# Patient Record
Sex: FEMALE | Race: WHITE | NOT HISPANIC OR LATINO | Employment: FULL TIME | URBAN - METROPOLITAN AREA
[De-identification: names, ages, dates, MRNs, and addresses within clinical notes are randomized per-mention and may not be internally consistent; named-entity substitution may affect disease eponyms.]

---

## 2024-11-11 DIAGNOSIS — I10 ESSENTIAL HYPERTENSION: ICD-10-CM

## 2024-11-11 RX ORDER — LISINOPRIL 20 MG/1
TABLET ORAL
Qty: 30 TABLET | Refills: 0 | OUTPATIENT
Start: 2024-11-11

## 2024-11-20 ENCOUNTER — OFFICE VISIT (OUTPATIENT)
Dept: FAMILY MEDICINE CLINIC | Facility: CLINIC | Age: 69
End: 2024-11-20
Payer: MEDICARE

## 2024-11-20 VITALS
HEIGHT: 66 IN | HEART RATE: 69 BPM | DIASTOLIC BLOOD PRESSURE: 88 MMHG | TEMPERATURE: 96.9 F | OXYGEN SATURATION: 98 % | BODY MASS INDEX: 29.44 KG/M2 | SYSTOLIC BLOOD PRESSURE: 130 MMHG | WEIGHT: 183.2 LBS

## 2024-11-20 DIAGNOSIS — I10 ESSENTIAL HYPERTENSION: ICD-10-CM

## 2024-11-20 DIAGNOSIS — Z00.00 MEDICARE ANNUAL WELLNESS VISIT, SUBSEQUENT: Primary | ICD-10-CM

## 2024-11-20 DIAGNOSIS — S32.000S LUMBAR COMPRESSION FRACTURE, SEQUELA: ICD-10-CM

## 2024-11-20 DIAGNOSIS — K21.9 GASTROESOPHAGEAL REFLUX DISEASE WITHOUT ESOPHAGITIS: ICD-10-CM

## 2024-11-20 DIAGNOSIS — F51.04 PSYCHOPHYSIOLOGICAL INSOMNIA: ICD-10-CM

## 2024-11-20 DIAGNOSIS — M80.00XG AGE-RELATED OSTEOPOROSIS WITH CURRENT PATHOLOGICAL FRACTURE WITH DELAYED HEALING: ICD-10-CM

## 2024-11-20 DIAGNOSIS — S22.000S THORACIC COMPRESSION FRACTURE, SEQUELA: ICD-10-CM

## 2024-11-20 DIAGNOSIS — E78.2 MIXED HYPERLIPIDEMIA: ICD-10-CM

## 2024-11-20 DIAGNOSIS — E55.9 VITAMIN D DEFICIENCY: ICD-10-CM

## 2024-11-20 RX ORDER — ZOLPIDEM TARTRATE 10 MG/1
10 TABLET ORAL NIGHTLY PRN
Qty: 30 TABLET | Refills: 1 | Status: SHIPPED | OUTPATIENT
Start: 2024-11-20

## 2024-11-20 RX ORDER — ROSUVASTATIN CALCIUM 40 MG/1
40 TABLET, COATED ORAL DAILY
Qty: 90 TABLET | Refills: 1 | Status: SHIPPED | OUTPATIENT
Start: 2024-11-20

## 2024-11-20 RX ORDER — EZETIMIBE 10 MG/1
10 TABLET ORAL DAILY
Qty: 90 TABLET | Refills: 1 | Status: SHIPPED | OUTPATIENT
Start: 2024-11-20

## 2024-11-21 NOTE — ASSESSMENT & PLAN NOTE
Hypertension is stable and controlled  Continue current treatment regimen.  Blood pressure will be reassessed in 6 months.    Orders:    Comprehensive metabolic panel; Future    Vitamin D 25 hydroxy; Future    TSH; Future    Lipid panel; Future    CBC w AUTO Differential; Future

## 2024-11-21 NOTE — ASSESSMENT & PLAN NOTE
Lipid abnormalities are stable    Plan:  Continue same medication/s without change.      Discussed medication dosage, use, side effects, and goals of treatment in detail.    Counseled patient on lifestyle modifications to help control hyperlipidemia.     Patient Treatment Goals:   LDL goal is less than 70  LDL goal is under 100    Followup in 6 months.    Orders:    ezetimibe (ZETIA) 10 MG tablet; Take 1 tablet by mouth Daily.    rosuvastatin (CRESTOR) 40 MG tablet; Take 1 tablet by mouth Daily.    Comprehensive metabolic panel; Future    Vitamin D 25 hydroxy; Future    TSH; Future    Lipid panel; Future    CBC w AUTO Differential; Future

## 2024-11-21 NOTE — PROGRESS NOTES
Subjective   The ABCs of the Annual Wellness Visit  Medicare Wellness Visit      Blessing Sommers is a 69 y.o. patient who presents for a Medicare Wellness Visit.    The following portions of the patient's history were reviewed and   updated as appropriate: allergies, current medications, past family history, past medical history, past social history, past surgical history, and problem list.    Compared to one year ago, the patient's physical   health is the same.  Compared to one year ago, the patient's mental   health is the same.    Recent Hospitalizations:  She was admitted within the past 365 days at [facility].     Current Medical Providers:  Patient Care Team:  Rodrigo Wilkes DO as PCP - General  Sidney Serrato MD (Orthopedic Surgery)  Rudolph Curran OD (Optometry)  Aki Gallegos MD as Consulting Physician (Hematology and Oncology)  Duane Doshi MD (Pain Medicine)  Christina Meza DC (Chiropractic Medicine)  Leonel Bahena MD as Surgeon (Orthopedic Surgery)    Outpatient Medications Prior to Visit   Medication Sig Dispense Refill    aspirin 81 MG tablet Take  by mouth.      B Complex Vitamins (VITAMIN B COMPLEX 100 IJ)       calcium carbonate EX (Tums Chewy Bites) 750 MG chewable tablet  (Patient taking differently: Chew 1 tablet 2 (Two) Times a Day.)      Multiple Vitamins-Calcium (DAILY VITAMINS FOR WOMEN PO)  (Patient taking differently: Take 1 tablet by mouth Daily.)      omeprazole (priLOSEC) 20 MG capsule Take 1 capsule by mouth 2 (Two) Times a Day. (Patient taking differently: Take 1 capsule by mouth Daily.)      Probiotic Product (RISAQUAD-2) capsule capsule Take 1 capsule by mouth Daily.      VITAMIN D PO Take  by mouth. (Patient taking differently: Take  by mouth Daily.)      ezetimibe (ZETIA) 10 MG tablet TAKE 1 TABLET BY MOUTH ONE TIME DAILY 90 tablet 0    rosuvastatin (CRESTOR) 40 MG tablet Take 1 tablet by mouth Daily. 90 tablet 0     benzonatate (TESSALON) 200 MG capsule Take 1 capsule by mouth 3 (Three) Times a Day As Needed for Cough for up to 10 days. 30 capsule 0    promethazine-dextromethorphan (PROMETHAZINE-DM) 6.25-15 MG/5ML syrup Take 5 mL by mouth 4 (Four) Times a Day As Needed for Cough. 118 mL 0     Facility-Administered Medications Prior to Visit   Medication Dose Route Frequency Provider Last Rate Last Admin    denosumab (PROLIA) syringe 60 mg  60 mg Subcutaneous Once Rodrigo Wilkes DO         No opioid medication identified on active medication list. I have reviewed chart for other potential  high risk medication/s and harmful drug interactions in the elderly.      Aspirin is on active medication list. Aspirin use is indicated based on review of current medical condition/s. Pros and cons of this therapy have been discussed today. Benefits of this medication outweigh potential harm.  Patient has been encouraged to continue taking this medication.  .      Patient Active Problem List   Diagnosis    Mixed hyperlipidemia    Age-related osteoporosis with current pathological fracture with delayed healing    Vitamin D deficiency    Postmenopausal    Screening mammogram, encounter for    AC joint arthropathy    Tendinitis of left rotator cuff    Subacromial impingement of left shoulder    Leg pain, anterior, left    Lumbar degenerative disc disease    Cyst of left kidney    Gastroesophageal reflux disease without esophagitis    Tendinitis of right ankle    Mild ankle sprain, sequela    Chronic pain of right ankle    Pain in both thighs    Chronic left hip pain    OA (osteoarthritis) of hip    Sacroiliac pain    Essential hypertension    History of bilateral hip replacements    Thoracic compression fracture, sequela    Lumbar compression fracture, sequela    Status post kyphoplasty     Advance Care Planning Advance Directive is not on file.  ACP discussion was held with the patient during this visit. Patient does not have an advance  "directive, information provided.            Objective   Vitals:    24 1314   BP: 130/88   BP Location: Left arm   Patient Position: Sitting   Cuff Size: Adult   Pulse: 69   Temp: 96.9 °F (36.1 °C)   TempSrc: Infrared   SpO2: 98%   Weight: 83.1 kg (183 lb 3.2 oz)   Height: 167.6 cm (65.98\")   PainSc:   4   PainLoc: Back       Estimated body mass index is 29.58 kg/m² as calculated from the following:    Height as of this encounter: 167.6 cm (65.98\").    Weight as of this encounter: 83.1 kg (183 lb 3.2 oz).            Does the patient have evidence of cognitive impairment? No  Lab Results   Component Value Date    CHLPL 174 10/28/2024    TRIG 119 10/28/2024    HDL 77 (H) 10/28/2024    LDL 76 10/28/2024    VLDL 21 10/28/2024                                                                                                Health  Risk Assessment    Smoking Status:  Social History     Tobacco Use   Smoking Status Former    Current packs/day: 0.00    Average packs/day: 0.3 packs/day for 20.0 years (5.0 ttl pk-yrs)    Types: Cigarettes    Start date: 1993    Quit date: 2022    Years since quittin.8   Smokeless Tobacco Never   Tobacco Comments    I smoked off and on for several years. About 4 cigarettes daily     Alcohol Consumption:  Social History     Substance and Sexual Activity   Alcohol Use Yes    Alcohol/week: 5.0 standard drinks of alcohol    Types: 5 Drinks containing 0.5 oz of alcohol per week       Fall Risk Screen  STEADI Fall Risk Assessment was completed, and patient is at LOW risk for falls.Assessment completed on:2024    Depression Screening   Little interest or pleasure in doing things? Not at all   Feeling down, depressed, or hopeless? Not at all   PHQ-2 Total Score 0      Health Habits and Functional and Cognitive Screenin/13/2024     9:44 AM   Functional & Cognitive Status   Do you have difficulty preparing food and eating? No    Do you have difficulty bathing yourself, " getting dressed or grooming yourself? No    Do you have difficulty using the toilet? No    Do you have difficulty moving around from place to place? No    Do you have trouble with steps or getting out of a bed or a chair? No    Current Diet Well Balanced Diet    Dental Exam Up to date    Eye Exam Up to date    Exercise (times per week) 4 times per week    Current Exercises Include Stationary Bicycling/Spin Class    Do you need help using the phone?  No    Are you deaf or do you have serious difficulty hearing?  No    Do you need help to go to places out of walking distance? No    Do you need help shopping? No    Do you need help preparing meals?  No    Do you need help with housework?  No    Do you need help with laundry? No    Do you need help taking your medications? No    Do you need help managing money? No    Do you ever drive or ride in a car without wearing a seat belt? No    Have you felt unusual stress, anger or loneliness in the last month? No    Who do you live with? Spouse    If you need help, do you have trouble finding someone available to you? No    Have you been bothered in the last four weeks by sexual problems? No    Do you have difficulty concentrating, remembering or making decisions? No        Patient-reported           Age-appropriate Screening Schedule:  Refer to the list below for future screening recommendations based on patient's age, sex and/or medical conditions. Orders for these recommended tests are listed in the plan section. The patient has been provided with a written plan.    Health Maintenance List  Health Maintenance   Topic Date Due    ZOSTER VACCINE (1 of 2) 11/20/2024 (Originally 7/28/2006)    COVID-19 Vaccine (3 - 2024-25 season) 11/22/2024 (Originally 9/1/2024)    HEPATITIS C SCREENING  12/31/2024 (Originally 2/12/2016)    TDAP/TD VACCINES (1 - Tdap) 12/31/2024 (Originally 7/28/1975)    INFLUENZA VACCINE  03/31/2025 (Originally 8/1/2024)    Pneumococcal Vaccine 65+ (1 of 2 -  PCV) 11/20/2025 (Originally [birth year]/28/1962)    BMI FOLLOWUP  09/25/2025    LIPID PANEL  10/28/2025    ANNUAL WELLNESS VISIT  11/20/2025    DXA SCAN  06/24/2026    MAMMOGRAM  09/27/2026    PAP SMEAR  11/20/2027    COLORECTAL CANCER SCREENING  09/07/2032                                                                                                                                                CMS Preventative Services Quick Reference  Risk Factors Identified During Encounter  Chronic Pain: Natural history and expected course discussed. Questions answered.  Immunizations Discussed/Encouraged: Influenza  Inactivity/Sedentary: Patient was advised to exercise at least 150 minutes a week per CDC recommendations.  Dental Screening Recommended  Vision Screening Recommended    The above risks/problems have been discussed with the patient.  Pertinent information has been shared with the patient in the After Visit Summary.  An After Visit Summary and PPPS were made available to the patient.    Follow Up:   Next Medicare Wellness visit to be scheduled in 1 year.         Additional E&M Note during same encounter follows:  Patient has additional, significant, and separately identifiable condition(s)/problem(s) that require work above and beyond the Medicare Wellness Visit     Chief Complaint  Medicare Wellness-subsequent (Labs in October)    Subjective   HPI 69-year-old white female here for AWV-Medicare as well as in follow-up for several different medical issues.  Patient with known history of hyperlipidemia, GERD as well as age-related osteoporosis and vitamin D deficiency.  Patient is status post compression fractures in the thoracic and lumbar spine.  This has been somewhat of a mystery given the fact that her DEXA scan showed osteoporosis but just borderline.  She has had workup to include biopsies of these regions with no evidence of multiple myeloma or other cancers.  She underwent kyphoplasty.  She has had extensive physical  "therapy and states that she is just now getting back to where she can bend over and put her socks on.  Preceding this but early this year she underwent bilateral hip replacements.  Her acute complaint is that she is not able to sleep.  She often goes to bed at 10 and wakes up at 2 and cannot go back to sleep the rest of the night.  She has been trying to exist on 4 hours of sleep per night.  She does use her 's 10 mg Ambien at times-cutting them in half which will often buy her a longer timeframe of sleep.  She has requesting her own prescription of this product.  Current medications include Zetia, rosuvastatin, 81 mg aspirin daily as well as omeprazole.  She does take a host of supplements and vitamins.  Blessing Jimenez is also being seen today for an annual adult preventative physical exam.  and Blessing Jimenez is also being seen today for additional medical problem/s.    Review of Systems   Cardiovascular:         Hyperlipidemia   Gastrointestinal:         GERD   Musculoskeletal:  Positive for arthralgias and back pain.   Psychiatric/Behavioral:  Positive for sleep disturbance.               Objective   Vital Signs:  /88 (BP Location: Left arm, Patient Position: Sitting, Cuff Size: Adult)   Pulse 69   Temp 96.9 °F (36.1 °C) (Infrared)   Ht 167.6 cm (65.98\")   Wt 83.1 kg (183 lb 3.2 oz)   SpO2 98%   BMI 29.58 kg/m²   Physical Exam  Vitals and nursing note reviewed.   Constitutional:       Appearance: Normal appearance. She is well-developed, well-groomed and overweight.   HENT:      Head: Normocephalic and atraumatic.   Neck:      Thyroid: No thyroid mass or thyromegaly.      Vascular: Normal carotid pulses. No carotid bruit.      Trachea: Trachea and phonation normal.   Cardiovascular:      Rate and Rhythm: Normal rate and regular rhythm.      Heart sounds: Normal heart sounds. No murmur heard.     No friction rub. No gallop.   Pulmonary:      Effort: Pulmonary effort is normal. No respiratory distress. "      Breath sounds: Normal breath sounds. No decreased breath sounds, wheezing, rhonchi or rales.   Musculoskeletal:      Cervical back: Neck supple.   Lymphadenopathy:      Cervical: No cervical adenopathy.   Skin:     General: Skin is warm and dry.      Findings: No rash.   Neurological:      Mental Status: She is alert and oriented to person, place, and time.   Psychiatric:         Attention and Perception: Attention and perception normal.         Mood and Affect: Mood and affect normal.         Speech: Speech normal.         Behavior: Behavior normal. Behavior is cooperative.         Thought Content: Thought content normal.         Cognition and Memory: Cognition and memory normal.         Judgment: Judgment normal.     See labs dated 10/28/2024  Admission on 11/09/2024, Discharged on 11/09/2024   Component Date Value Ref Range Status    SARS Antigen 11/09/2024 Not Detected  Not Detected, Presumptive Negative Final    Influenza A Antigen ANTHONY 11/09/2024 Not Detected  Not Detected Final    Influenza B Antigen ANTHONY 11/09/2024 Not Detected  Not Detected Final    Internal Control 11/09/2024 Passed  Passed Final    Lot Number 11/09/2024 [id number]   Final    Expiration Date 11/09/2024 09/04/2025   Final         The following data was reviewed by: Rodrigo Wilkes DO on 11/20/2024:  Data reviewed : GI studies colonoscopy  Common labs          7/7/2024    10:40 10/8/2024    09:34 10/28/2024    10:10   Common Labs   Glucose  88  94    BUN  12  12    Creatinine  0.62  0.72    Sodium  140  141    Potassium  4.7  4.3    Chloride  102  103    Calcium 8.1     9.6  9.3    Total Protein   6.7    Albumin   4.5    Total Bilirubin   0.4    Alkaline Phosphatase   89    AST (SGOT)   27    ALT (SGPT)   27    WBC 8.00      7.96    Hemoglobin 12.4      14.4    Hematocrit 37.3      41.6    Platelets 201      197    Total Cholesterol   174    Triglycerides   119    HDL Cholesterol   77    LDL Cholesterol    76       Details          This  result is from an external source.                     Assessment and Plan Additional age appropriate preventative wellness advice topics were discussed during today's preventative wellness exam(some topics already addressed during AWV portion of the note above):    Physical Activity: Advised cardiovascular activity 150 minutes per week as tolerated. (example brisk walk for 30 minutes, 5 days a week).     Nutrition: Discussed nutrition plan with patient. Information shared in after visit summary. Goal is for a well balanced diet to enhance overall health.     Healthy Weight: Discussed current and goal BMI with patient. Steps to attain this goal discussed. Information shared in after visit summary.     Motor Vehicle Safety Discussion:  Wearing Seatbelt While in Motor Vehicle recommendation. Adhering to posted speed limit recommendation.     Injury Prevention Discussion:  Information shared in after visit summary.                 Medicare annual wellness visit, subsequent    Orders:    Comprehensive metabolic panel; Future    Vitamin D 25 hydroxy; Future    TSH; Future    Lipid panel; Future    CBC w AUTO Differential; Future    Psychophysiological insomnia  Psychological condition is stable.  Continue current treatment regimen.  Psychological condition  will be reassessed in 6 months.    Orders:    zolpidem (AMBIEN) 10 MG tablet; Take 1 tablet by mouth At Night As Needed for Sleep.    Comprehensive metabolic panel; Future    Vitamin D 25 hydroxy; Future    TSH; Future    Lipid panel; Future    CBC w AUTO Differential; Future    Essential hypertension  Hypertension is stable and controlled  Continue current treatment regimen.  Blood pressure will be reassessed in 6 months.    Orders:    Comprehensive metabolic panel; Future    Vitamin D 25 hydroxy; Future    TSH; Future    Lipid panel; Future    CBC w AUTO Differential; Future    Mixed hyperlipidemia   Lipid abnormalities are stable    Plan:  Continue same  medication/s without change.      Discussed medication dosage, use, side effects, and goals of treatment in detail.    Counseled patient on lifestyle modifications to help control hyperlipidemia.     Patient Treatment Goals:   LDL goal is less than 70  LDL goal is under 100    Followup in 6 months.    Orders:    ezetimibe (ZETIA) 10 MG tablet; Take 1 tablet by mouth Daily.    rosuvastatin (CRESTOR) 40 MG tablet; Take 1 tablet by mouth Daily.    Comprehensive metabolic panel; Future    Vitamin D 25 hydroxy; Future    TSH; Future    Lipid panel; Future    CBC w AUTO Differential; Future    Vitamin D deficiency    Orders:    Comprehensive metabolic panel; Future    Vitamin D 25 hydroxy; Future    TSH; Future    Lipid panel; Future    CBC w AUTO Differential; Future    Gastroesophageal reflux disease without esophagitis    Orders:    Comprehensive metabolic panel; Future    Vitamin D 25 hydroxy; Future    TSH; Future    Lipid panel; Future    CBC w AUTO Differential; Future    Age-related osteoporosis with current pathological fracture with delayed healing    Orders:    Comprehensive metabolic panel; Future    Vitamin D 25 hydroxy; Future    TSH; Future    Lipid panel; Future    CBC w AUTO Differential; Future    Lumbar compression fracture, sequela    Orders:    Comprehensive metabolic panel; Future    Vitamin D 25 hydroxy; Future    TSH; Future    Lipid panel; Future    CBC w AUTO Differential; Future    Thoracic compression fracture, sequela    Orders:    Comprehensive metabolic panel; Future    Vitamin D 25 hydroxy; Future    TSH; Future    Lipid panel; Future    CBC w AUTO Differential; Future          I spent 30 minutes caring for Blessing Jimenez on this date of service. This time includes time spent by me in the following activities:preparing for the visit, reviewing tests, obtaining and/or reviewing a separately obtained history, performing a medically appropriate examination and/or evaluation , counseling and  educating the patient/family/caregiver, ordering medications, tests, or procedures, referring and communicating with other health care professionals , documenting information in the medical record, independently interpreting results and communicating that information with the patient/family/caregiver, and care coordination  Follow Up   Return in about 6 months (around 5/20/2025) for Recheck.  Patient was given instructions and counseling regarding her condition or for health maintenance advice. Please see specific information pulled into the AVS if appropriate.

## 2024-11-21 NOTE — ASSESSMENT & PLAN NOTE
Orders:    Comprehensive metabolic panel; Future    Vitamin D 25 hydroxy; Future    TSH; Future    Lipid panel; Future    CBC w AUTO Differential; Future

## 2024-12-30 ENCOUNTER — HOSPITAL ENCOUNTER (OUTPATIENT)
Dept: INFUSION THERAPY | Facility: HOSPITAL | Age: 69
Discharge: HOME | End: 2024-12-30
Admitting: FAMILY MEDICINE
Payer: MEDICARE

## 2024-12-30 VITALS
SYSTOLIC BLOOD PRESSURE: 134 MMHG | HEART RATE: 79 BPM | DIASTOLIC BLOOD PRESSURE: 74 MMHG | RESPIRATION RATE: 16 BRPM | OXYGEN SATURATION: 96 % | TEMPERATURE: 97.1 F

## 2024-12-30 DIAGNOSIS — M81.0 AGE-RELATED OSTEOPOROSIS WITHOUT CURRENT PATHOLOGICAL FRACTURE: Primary | ICD-10-CM

## 2024-12-30 DIAGNOSIS — M80.00XG AGE-RELATED OSTEOPOROSIS WITH CURRENT PATHOLOGICAL FRACTURE WITH DELAYED HEALING: ICD-10-CM

## 2024-12-30 LAB
MAGNESIUM SERPL-MCNC: 1.8 MG/DL (ref 1.6–2.4)
PHOSPHATE SERPL-MCNC: 3.4 MG/DL (ref 2.5–4.5)

## 2024-12-30 PROCEDURE — 25010000002 DENOSUMAB 60 MG/ML SOLUTION PREFILLED SYRINGE: Performed by: FAMILY MEDICINE

## 2024-12-30 PROCEDURE — 36415 COLL VENOUS BLD VENIPUNCTURE: CPT

## 2024-12-30 PROCEDURE — 84100 ASSAY OF PHOSPHORUS: CPT | Performed by: FAMILY MEDICINE

## 2024-12-30 PROCEDURE — 96372 THER/PROPH/DIAG INJ SC/IM: CPT

## 2024-12-30 PROCEDURE — 83735 ASSAY OF MAGNESIUM: CPT | Performed by: FAMILY MEDICINE

## 2024-12-30 RX ADMIN — DENOSUMAB 60 MG: 60 INJECTION SUBCUTANEOUS at 09:29

## 2024-12-30 NOTE — PATIENT INSTRUCTIONS
"  Call [facility] at [phone] if you have any problems or concerns.    We know you have a Choice in healthcare and appreciate you using [facility].  Our purpose is to provide you \"Excellent Care\".  We hope that you will always choose us in the future and continue to recommend us to your family and friends.              "

## 2024-12-30 NOTE — NURSING NOTE
PT ARRIVED TO [facility] FOR APPT. VSS, NO COMPLAINTS AT THIS TIME. LABS DRAWN VIA VENIPUNCTURE. MEDICATION ADMINISTERED PER MD ORDER. PT TOLERATED WELL. AVS OFFERED, PT REFUSED. PT DISCHARGED FROM [facility] AT 9:33 AM IN STABLE CONDITION, WITHOUT COMPLAINTS.

## 2025-05-07 DIAGNOSIS — M80.00XG AGE-RELATED OSTEOPOROSIS WITH CURRENT PATHOLOGICAL FRACTURE WITH DELAYED HEALING: Primary | ICD-10-CM

## 2025-05-17 DIAGNOSIS — E78.2 MIXED HYPERLIPIDEMIA: ICD-10-CM

## 2025-05-19 ENCOUNTER — TELEPHONE (OUTPATIENT)
Dept: ENDOCRINOLOGY | Age: 70
End: 2025-05-19
Payer: MEDICARE

## 2025-05-19 DIAGNOSIS — S32.000S LUMBAR COMPRESSION FRACTURE, SEQUELA: ICD-10-CM

## 2025-05-19 DIAGNOSIS — M80.00XG AGE-RELATED OSTEOPOROSIS WITH CURRENT PATHOLOGICAL FRACTURE WITH DELAYED HEALING: ICD-10-CM

## 2025-05-19 DIAGNOSIS — K21.9 GASTROESOPHAGEAL REFLUX DISEASE WITHOUT ESOPHAGITIS: ICD-10-CM

## 2025-05-19 DIAGNOSIS — S22.000S THORACIC COMPRESSION FRACTURE, SEQUELA: ICD-10-CM

## 2025-05-19 DIAGNOSIS — I10 ESSENTIAL HYPERTENSION: ICD-10-CM

## 2025-05-19 DIAGNOSIS — F51.04 PSYCHOPHYSIOLOGICAL INSOMNIA: ICD-10-CM

## 2025-05-19 DIAGNOSIS — E78.2 MIXED HYPERLIPIDEMIA: ICD-10-CM

## 2025-05-19 DIAGNOSIS — E55.9 VITAMIN D DEFICIENCY: ICD-10-CM

## 2025-05-19 DIAGNOSIS — Z00.00 MEDICARE ANNUAL WELLNESS VISIT, SUBSEQUENT: ICD-10-CM

## 2025-05-19 DIAGNOSIS — M80.00XG AGE-RELATED OSTEOPOROSIS WITH CURRENT PATHOLOGICAL FRACTURE WITH DELAYED HEALING: Primary | ICD-10-CM

## 2025-05-19 RX ORDER — EZETIMIBE 10 MG/1
10 TABLET ORAL DAILY
Qty: 90 TABLET | Refills: 0 | Status: SHIPPED | OUTPATIENT
Start: 2025-05-19

## 2025-05-23 LAB
25(OH)D3+25(OH)D2 SERPL-MCNC: 83.2 NG/ML (ref 30–100)
ALBUMIN SERPL-MCNC: 4.6 G/DL (ref 3.5–5.2)
ALBUMIN/GLOB SERPL: 1.9 G/DL
ALP SERPL-CCNC: 88 U/L (ref 39–117)
ALT SERPL-CCNC: 32 U/L (ref 1–33)
AST SERPL-CCNC: 34 U/L (ref 1–32)
BASOPHILS # BLD AUTO: 0.05 10*3/MM3 (ref 0–0.2)
BASOPHILS NFR BLD AUTO: 0.9 % (ref 0–1.5)
BILIRUB SERPL-MCNC: 0.4 MG/DL (ref 0–1.2)
BUN SERPL-MCNC: 9 MG/DL (ref 8–23)
BUN/CREAT SERPL: 12.3 (ref 7–25)
CALCIUM SERPL-MCNC: 9.4 MG/DL (ref 8.6–10.5)
CHLORIDE SERPL-SCNC: 106 MMOL/L (ref 98–107)
CHOLEST SERPL-MCNC: 159 MG/DL (ref 0–200)
CO2 SERPL-SCNC: 25.9 MMOL/L (ref 22–29)
CREAT SERPL-MCNC: 0.73 MG/DL (ref 0.57–1)
EGFRCR SERPLBLD CKD-EPI 2021: 89.7 ML/MIN/1.73
EOSINOPHIL # BLD AUTO: 0.21 10*3/MM3 (ref 0–0.4)
EOSINOPHIL NFR BLD AUTO: 3.9 % (ref 0.3–6.2)
ERYTHROCYTE [DISTWIDTH] IN BLOOD BY AUTOMATED COUNT: 12.3 % (ref 12.3–15.4)
GLOBULIN SER CALC-MCNC: 2.4 GM/DL
GLUCOSE SERPL-MCNC: 103 MG/DL (ref 65–99)
HCT VFR BLD AUTO: 42.8 % (ref 34–46.6)
HDLC SERPL-MCNC: 68 MG/DL (ref 40–60)
HGB BLD-MCNC: 14.8 G/DL (ref 12–15.9)
IMM GRANULOCYTES # BLD AUTO: 0.01 10*3/MM3 (ref 0–0.05)
IMM GRANULOCYTES NFR BLD AUTO: 0.2 % (ref 0–0.5)
LDLC SERPL CALC-MCNC: 76 MG/DL (ref 0–100)
LYMPHOCYTES # BLD AUTO: 1.35 10*3/MM3 (ref 0.7–3.1)
LYMPHOCYTES NFR BLD AUTO: 25.3 % (ref 19.6–45.3)
MCH RBC QN AUTO: 33 PG (ref 26.6–33)
MCHC RBC AUTO-ENTMCNC: 34.6 G/DL (ref 31.5–35.7)
MCV RBC AUTO: 95.5 FL (ref 79–97)
MONOCYTES # BLD AUTO: 0.57 10*3/MM3 (ref 0.1–0.9)
MONOCYTES NFR BLD AUTO: 10.7 % (ref 5–12)
NEUTROPHILS # BLD AUTO: 3.14 10*3/MM3 (ref 1.7–7)
NEUTROPHILS NFR BLD AUTO: 59 % (ref 42.7–76)
NRBC BLD AUTO-RTO: 0 /100 WBC (ref 0–0.2)
PLATELET # BLD AUTO: 177 10*3/MM3 (ref 140–450)
POTASSIUM SERPL-SCNC: 4.7 MMOL/L (ref 3.5–5.2)
PROT SERPL-MCNC: 7 G/DL (ref 6–8.5)
RBC # BLD AUTO: 4.48 10*6/MM3 (ref 3.77–5.28)
SODIUM SERPL-SCNC: 143 MMOL/L (ref 136–145)
TRIGL SERPL-MCNC: 78 MG/DL (ref 0–150)
TSH SERPL DL<=0.005 MIU/L-ACNC: 2.06 UIU/ML (ref 0.27–4.2)
VLDLC SERPL CALC-MCNC: 15 MG/DL (ref 5–40)
WBC # BLD AUTO: 5.33 10*3/MM3 (ref 3.4–10.8)

## 2025-05-29 ENCOUNTER — OFFICE VISIT (OUTPATIENT)
Dept: FAMILY MEDICINE CLINIC | Facility: CLINIC | Age: 70
End: 2025-05-29
Payer: MEDICARE

## 2025-05-29 VITALS
BODY MASS INDEX: 30.13 KG/M2 | DIASTOLIC BLOOD PRESSURE: 76 MMHG | SYSTOLIC BLOOD PRESSURE: 120 MMHG | WEIGHT: 187.5 LBS | HEART RATE: 71 BPM | OXYGEN SATURATION: 97 % | HEIGHT: 66 IN | TEMPERATURE: 97.3 F

## 2025-05-29 DIAGNOSIS — K21.9 GASTROESOPHAGEAL REFLUX DISEASE WITHOUT ESOPHAGITIS: ICD-10-CM

## 2025-05-29 DIAGNOSIS — M51.360 DEGENERATION OF INTERVERTEBRAL DISC OF LUMBAR REGION WITH DISCOGENIC BACK PAIN: ICD-10-CM

## 2025-05-29 DIAGNOSIS — Z98.890 STATUS POST KYPHOPLASTY: ICD-10-CM

## 2025-05-29 DIAGNOSIS — S32.000S LUMBAR COMPRESSION FRACTURE, SEQUELA: ICD-10-CM

## 2025-05-29 DIAGNOSIS — M80.00XG AGE-RELATED OSTEOPOROSIS WITH CURRENT PATHOLOGICAL FRACTURE WITH DELAYED HEALING: ICD-10-CM

## 2025-05-29 DIAGNOSIS — E55.9 VITAMIN D DEFICIENCY: ICD-10-CM

## 2025-05-29 DIAGNOSIS — R73.02 GLUCOSE INTOLERANCE (IMPAIRED GLUCOSE TOLERANCE): ICD-10-CM

## 2025-05-29 DIAGNOSIS — R82.90 ABNORMAL URINE FINDINGS: ICD-10-CM

## 2025-05-29 DIAGNOSIS — S22.000S THORACIC COMPRESSION FRACTURE, SEQUELA: ICD-10-CM

## 2025-05-29 DIAGNOSIS — M80.00XG AGE-RELATED OSTEOPOROSIS WITH CURRENT PATHOLOGICAL FRACTURE WITH DELAYED HEALING: Primary | ICD-10-CM

## 2025-05-29 DIAGNOSIS — E55.9 VITAMIN D DEFICIENCY DISEASE: ICD-10-CM

## 2025-05-29 DIAGNOSIS — E78.2 MIXED HYPERLIPIDEMIA: Primary | ICD-10-CM

## 2025-05-29 DIAGNOSIS — Z78.0 POSTMENOPAUSAL: ICD-10-CM

## 2025-05-29 DIAGNOSIS — N30.00 ACUTE CYSTITIS WITHOUT HEMATURIA: ICD-10-CM

## 2025-05-29 LAB
BILIRUB BLD-MCNC: NEGATIVE MG/DL
BUN SERPL-MCNC: 10 MG/DL (ref 8–23)
BUN/CREAT SERPL: 13.9 (ref 7–25)
CALCIUM SERPL-MCNC: 9.2 MG/DL (ref 8.6–10.5)
CHLORIDE SERPL-SCNC: 106 MMOL/L (ref 98–107)
CLARITY, POC: CLEAR
CO2 SERPL-SCNC: 26.7 MMOL/L (ref 22–29)
COLOR UR: YELLOW
CREAT SERPL-MCNC: 0.72 MG/DL (ref 0.57–1)
EGFRCR SERPLBLD CKD-EPI 2021: 91.2 ML/MIN/1.73
GLUCOSE SERPL-MCNC: 98 MG/DL (ref 65–99)
GLUCOSE UR STRIP-MCNC: NEGATIVE MG/DL
KETONES UR QL: NEGATIVE
LEUKOCYTE EST, POC: ABNORMAL
NITRITE UR-MCNC: NEGATIVE MG/ML
PH UR: 6.5 [PH] (ref 5–8)
POTASSIUM SERPL-SCNC: 4.8 MMOL/L (ref 3.5–5.2)
PROT UR STRIP-MCNC: NEGATIVE MG/DL
PTH-INTACT SERPL-MCNC: NORMAL PG/ML
RBC # UR STRIP: ABNORMAL /UL
SODIUM SERPL-SCNC: 144 MMOL/L (ref 136–145)
SP GR UR: 1 (ref 1–1.03)
SPECIMEN STATUS: NORMAL
UROBILINOGEN UR QL: ABNORMAL

## 2025-05-29 RX ORDER — CIPROFLOXACIN 500 MG/1
500 TABLET, FILM COATED ORAL 2 TIMES DAILY
Qty: 14 TABLET | Refills: 0 | Status: SHIPPED | OUTPATIENT
Start: 2025-05-29

## 2025-05-30 PROBLEM — R73.02 GLUCOSE INTOLERANCE (IMPAIRED GLUCOSE TOLERANCE): Status: ACTIVE | Noted: 2025-05-30

## 2025-05-30 RX ORDER — ROSUVASTATIN CALCIUM 40 MG/1
40 TABLET, COATED ORAL DAILY
Qty: 90 TABLET | Refills: 1 | Status: SHIPPED | OUTPATIENT
Start: 2025-05-30

## 2025-05-30 NOTE — PROGRESS NOTES
Subjective   Blessing Sommers is a 69 y.o. female with   Chief Complaint   Patient presents with    Hypertension     Labs prior    Hyperlipidemia    Vitamin D Deficiency    Insomnia    Pressure with urination     Just finished antibiotics for UTI, still having pressure, did a urine dip, trace blood small amount of leukocytes    .    Hypertension  Hyperlipidemia    Insomnia     69-year-old white female with multiple medical issues here for further medical management.  Patient with known history of hypertension, hyperlipidemia as well as vitamin D deficiency.  She also has history of osteoporosis and is status post multiple vertebral fractures.  She is status post bilateral hip replacement with apparently the wrong prosthetic device placed in the right hip resulting in a leg shorter than the other.  She has been offered a revision which she is very hesitant to do given the comfort and recovery time following this type of surgery.  She has just been in [place] for a prolonged vacation.  She has history of insomnia.  She is also complaining of suprapubic pressure with urinary frequency and urgency.  Patient denies gross hematuria and there has not been any overt dysuria.  Current medications include 81 mg aspirin, Zetia, omeprazole as well as rosuvastatin.  She is also using Zolipidem, and is receiving Prolia on a twice a year basis.  All medications and supplements are used appropriately and are well-tolerated without side effects.  Fasting labs have been acquired prior to this visit.  Last DEXA scan was 1 year ago.  The following portions of the patient's history were reviewed and updated as appropriate: allergies, current medications, past family history, past medical history, past social history, past surgical history and problem list.    Review of Systems   Cardiovascular:         Hyperlipidemia   Gastrointestinal:         GERD   Musculoskeletal:         Osteoporosis   Psychiatric/Behavioral:  Positive for sleep  disturbance. The patient has insomnia.        Objective     Vitals:    05/29/25 1358   BP: 120/76   Pulse: 71   Temp: 97.3 °F (36.3 °C)   SpO2: 97%       Recent Results (from the past 4 weeks)   PTH, Intact    Collection Time: 05/22/25  8:32 AM    Specimen: Blood   Result Value Ref Range    PTH, Intact CANCELED pg/mL   Basic Metabolic Panel    Collection Time: 05/22/25  8:32 AM    Specimen: Blood   Result Value Ref Range    Glucose 98 65 - 99 mg/dL    BUN 10 8 - 23 mg/dL    Creatinine 0.72 0.57 - 1.00 mg/dL    EGFR Result 91.2 >60.0 mL/min/1.73    BUN/Creatinine Ratio 13.9 7.0 - 25.0    Sodium 144 136 - 145 mmol/L    Potassium 4.8 3.5 - 5.2 mmol/L    Chloride 106 98 - 107 mmol/L    Total CO2 26.7 22.0 - 29.0 mmol/L    Calcium 9.2 8.6 - 10.5 mg/dL   Specimen Status Report    Collection Time: 05/22/25  8:32 AM   Result Value Ref Range    Specimen Status CANCELED    Comprehensive metabolic panel    Collection Time: 05/22/25  8:37 AM    Specimen: Blood   Result Value Ref Range    Glucose 103 (H) 65 - 99 mg/dL    BUN 9 8 - 23 mg/dL    Creatinine 0.73 0.57 - 1.00 mg/dL    EGFR Result 89.7 >60.0 mL/min/1.73    BUN/Creatinine Ratio 12.3 7.0 - 25.0    Sodium 143 136 - 145 mmol/L    Potassium 4.7 3.5 - 5.2 mmol/L    Chloride 106 98 - 107 mmol/L    Total CO2 25.9 22.0 - 29.0 mmol/L    Calcium 9.4 8.6 - 10.5 mg/dL    Total Protein 7.0 6.0 - 8.5 g/dL    Albumin 4.6 3.5 - 5.2 g/dL    Globulin 2.4 gm/dL    A/G Ratio 1.9 g/dL    Total Bilirubin 0.4 0.0 - 1.2 mg/dL    Alkaline Phosphatase 88 39 - 117 U/L    AST (SGOT) 34 (H) 1 - 32 U/L    ALT (SGPT) 32 1 - 33 U/L   Vitamin D 25 hydroxy    Collection Time: 05/22/25  8:37 AM    Specimen: Blood   Result Value Ref Range    25 Hydroxy, Vitamin D 83.2 30.0 - 100.0 ng/ml   TSH    Collection Time: 05/22/25  8:37 AM    Specimen: Blood   Result Value Ref Range    TSH 2.060 0.270 - 4.200 uIU/mL   Lipid panel    Collection Time: 05/22/25  8:37 AM    Specimen: Blood   Result Value Ref Range     Total Cholesterol 159 0 - 200 mg/dL    Triglycerides 78 0 - 150 mg/dL    HDL Cholesterol 68 (H) 40 - 60 mg/dL    VLDL Cholesterol Lee 15 5 - 40 mg/dL    LDL Chol Calc (NIH) 76 0 - 100 mg/dL   CBC w AUTO Differential    Collection Time: 05/22/25  8:37 AM    Specimen: Blood   Result Value Ref Range    WBC 5.33 3.40 - 10.80 10*3/mm3    RBC 4.48 3.77 - 5.28 10*6/mm3    Hemoglobin 14.8 12.0 - 15.9 g/dL    Hematocrit 42.8 34.0 - 46.6 %    MCV 95.5 79.0 - 97.0 fL    MCH 33.0 26.6 - 33.0 pg    MCHC 34.6 31.5 - 35.7 g/dL    RDW 12.3 12.3 - 15.4 %    Platelets 177 140 - 450 10*3/mm3    Neutrophil Rel % 59.0 42.7 - 76.0 %    Lymphocyte Rel % 25.3 19.6 - 45.3 %    Monocyte Rel % 10.7 5.0 - 12.0 %    Eosinophil Rel % 3.9 0.3 - 6.2 %    Basophil Rel % 0.9 0.0 - 1.5 %    Neutrophils Absolute 3.14 1.70 - 7.00 10*3/mm3    Lymphocytes Absolute 1.35 0.70 - 3.10 10*3/mm3    Monocytes Absolute 0.57 0.10 - 0.90 10*3/mm3    Eosinophils Absolute 0.21 0.00 - 0.40 10*3/mm3    Basophils Absolute 0.05 0.00 - 0.20 10*3/mm3    Immature Granulocyte Rel % 0.2 0.0 - 0.5 %    Immature Grans Absolute 0.01 0.00 - 0.05 10*3/mm3    nRBC 0.0 0.0 - 0.2 /100 WBC   POC Urinalysis Dipstick    Collection Time: 05/29/25  2:10 PM    Specimen: Urine   Result Value Ref Range    Color Yellow Yellow, Straw, Dark Yellow, [place]    Clarity, UA Clear Clear    Glucose, UA Negative Negative mg/dL    Bilirubin Negative Negative    Ketones, UA Negative Negative    Specific Gravity  1.005 1.005 - 1.030    Blood, UA Trace (A) Negative    pH, Urine 6.5 5.0 - 8.0    Protein, POC Negative Negative mg/dL    Urobilinogen, UA 0.2 E.U./dL Normal, 0.2 E.U./dL    Leukocytes Small (1+) (A) Negative    Nitrite, UA Negative Negative       Physical Exam  Vitals and nursing note reviewed.   Constitutional:       Appearance: Normal appearance. She is well-developed and well-groomed. She is obese.      Comments: Exogenous obesity with a BMI of 30.3   HENT:      Head: Normocephalic and  atraumatic.   Neck:      Thyroid: No thyroid mass or thyromegaly.      Vascular: Normal carotid pulses. No carotid bruit.      Trachea: Trachea and phonation normal.   Cardiovascular:      Rate and Rhythm: Normal rate and regular rhythm.      Heart sounds: Normal heart sounds. No murmur heard.     No friction rub. No gallop.   Pulmonary:      Effort: Pulmonary effort is normal. No respiratory distress.      Breath sounds: Normal breath sounds. No decreased breath sounds, wheezing, rhonchi or rales.   Musculoskeletal:      Cervical back: Neck supple.   Lymphadenopathy:      Cervical: No cervical adenopathy.   Skin:     General: Skin is warm and dry.      Findings: No rash.   Neurological:      Mental Status: She is alert and oriented to person, place, and time.   Psychiatric:         Attention and Perception: Attention and perception normal.         Mood and Affect: Mood and affect normal.         Speech: Speech normal.         Behavior: Behavior normal. Behavior is cooperative.         Thought Content: Thought content normal.         Cognition and Memory: Cognition and memory normal.         Judgment: Judgment normal.         Assessment & Plan   Diagnoses and all orders for this visit:    1. Mixed hyperlipidemia (Primary)  -     rosuvastatin (CRESTOR) 40 MG tablet; Take 1 tablet by mouth Daily.  Dispense: 90 tablet; Refill: 1  -     Hemoglobin A1c; Future  -     Lipid panel; Future  -     Comprehensive metabolic panel; Future  -     TSH; Future  -     Vitamin D 25 hydroxy; Future  -     CBC w AUTO Differential; Future    2. Acute cystitis without hematuria  -     POC Urinalysis Dipstick  -     Urine Culture - Urine, Urine, Clean Catch  -     ciprofloxacin (Cipro) 500 MG tablet; Take 1 tablet by mouth 2 (Two) Times a Day.  Dispense: 14 tablet; Refill: 0  -     Hemoglobin A1c; Future  -     Lipid panel; Future  -     Comprehensive metabolic panel; Future  -     TSH; Future  -     Vitamin D 25 hydroxy; Future  -      CBC w AUTO Differential; Future    3. Abnormal urine findings  -     Urine Culture - Urine, Urine, Clean Catch  -     ciprofloxacin (Cipro) 500 MG tablet; Take 1 tablet by mouth 2 (Two) Times a Day.  Dispense: 14 tablet; Refill: 0  -     Hemoglobin A1c; Future  -     Lipid panel; Future  -     Comprehensive metabolic panel; Future  -     TSH; Future  -     Vitamin D 25 hydroxy; Future  -     CBC w AUTO Differential; Future    4. Vitamin D deficiency  -     Hemoglobin A1c; Future  -     Lipid panel; Future  -     Comprehensive metabolic panel; Future  -     TSH; Future  -     Vitamin D 25 hydroxy; Future  -     CBC w AUTO Differential; Future    5. Gastroesophageal reflux disease without esophagitis  -     Hemoglobin A1c; Future  -     Lipid panel; Future  -     Comprehensive metabolic panel; Future  -     TSH; Future  -     Vitamin D 25 hydroxy; Future  -     CBC w AUTO Differential; Future    6. Postmenopausal  -     DEXA Bone Density Axial; Future  -     Hemoglobin A1c; Future  -     Lipid panel; Future  -     Comprehensive metabolic panel; Future  -     TSH; Future  -     Vitamin D 25 hydroxy; Future  -     CBC w AUTO Differential; Future    7. Age-related osteoporosis with current pathological fracture with delayed healing  -     DEXA Bone Density Axial; Future  -     Hemoglobin A1c; Future  -     Lipid panel; Future  -     Comprehensive metabolic panel; Future  -     TSH; Future  -     Vitamin D 25 hydroxy; Future  -     CBC w AUTO Differential; Future    8. Status post kyphoplasty  -     Hemoglobin A1c; Future  -     Lipid panel; Future  -     Comprehensive metabolic panel; Future  -     TSH; Future  -     Vitamin D 25 hydroxy; Future  -     CBC w AUTO Differential; Future    9. Degeneration of intervertebral disc of lumbar region with discogenic back pain  -     Hemoglobin A1c; Future  -     Lipid panel; Future  -     Comprehensive metabolic panel; Future  -     TSH; Future  -     Vitamin D 25 hydroxy;  Future  -     CBC w AUTO Differential; Future    10. Lumbar compression fracture, sequela  -     Hemoglobin A1c; Future  -     Lipid panel; Future  -     Comprehensive metabolic panel; Future  -     TSH; Future  -     Vitamin D 25 hydroxy; Future  -     CBC w AUTO Differential; Future    11. Thoracic compression fracture, sequela  -     Hemoglobin A1c; Future  -     Lipid panel; Future  -     Comprehensive metabolic panel; Future  -     TSH; Future  -     Vitamin D 25 hydroxy; Future  -     CBC w AUTO Differential; Future    12. Glucose intolerance (impaired glucose tolerance)  -     Hemoglobin A1c; Future  -     Lipid panel; Future  -     Comprehensive metabolic panel; Future  -     TSH; Future  -     Vitamin D 25 hydroxy; Future  -     CBC w AUTO Differential; Future        Return in about 6 months (around 11/29/2025) for Recheck.

## 2025-06-01 LAB
BACTERIA UR CULT: NORMAL
BACTERIA UR CULT: NORMAL

## 2025-06-03 ENCOUNTER — OFFICE VISIT (OUTPATIENT)
Dept: ENDOCRINOLOGY | Age: 70
End: 2025-06-03
Payer: MEDICARE

## 2025-06-03 VITALS
HEART RATE: 77 BPM | DIASTOLIC BLOOD PRESSURE: 74 MMHG | WEIGHT: 188.6 LBS | SYSTOLIC BLOOD PRESSURE: 118 MMHG | TEMPERATURE: 97.7 F | BODY MASS INDEX: 30.46 KG/M2 | OXYGEN SATURATION: 97 %

## 2025-06-03 DIAGNOSIS — M80.00XG AGE-RELATED OSTEOPOROSIS WITH CURRENT PATHOLOGICAL FRACTURE WITH DELAYED HEALING: Primary | ICD-10-CM

## 2025-06-03 PROCEDURE — 3074F SYST BP LT 130 MM HG: CPT | Performed by: INTERNAL MEDICINE

## 2025-06-03 PROCEDURE — 1159F MED LIST DOCD IN RCRD: CPT | Performed by: INTERNAL MEDICINE

## 2025-06-03 PROCEDURE — 1160F RVW MEDS BY RX/DR IN RCRD: CPT | Performed by: INTERNAL MEDICINE

## 2025-06-03 PROCEDURE — 3078F DIAST BP <80 MM HG: CPT | Performed by: INTERNAL MEDICINE

## 2025-06-03 PROCEDURE — 99214 OFFICE O/P EST MOD 30 MIN: CPT | Performed by: INTERNAL MEDICINE

## 2025-06-03 NOTE — PROGRESS NOTES
Chief complaint   Chief Complaint   Patient presents with    Age-related osteoporosis with current pathological fracture          Subjective     History of Present Illness:      This is a 69 years old female I am seeing for bone disease   referred by PCP     Last OV was 6 months ago   She is here for a follow up   other medical issues   1- HLD   2- HTN   3- Others like H/O 2 hip replacement in 2024 due to in OA   Pt was Dx with Osteoporosis by PCP , no won Prolia every 6 months   Is not on steroids now or in the past   had blood work done by PCP in 7-2024: PTH was 135, N GFR Alk phos bone was 103   had blood work done by PCP 5-2024: TSH was 1.12, Vit D 97  Last DXA scan was in 7-2024  pt states that she had back pain in 3-24 and then gets it again in April and work up showed several compression fractures . She underwent 2 kyphoplasty procedures a while ago with good pain relief but he she states that her pain has been coming back recently and will have 3 more procedures done soon . She had CT scan of her abdomen and pelvis and chest for a hematology and oncology workup. They demonstrate compression fractures at T8, T11, T12, L3, and well-positioned instrumentation at L4 and L5. Was on Prolia for 5 years and last dose in 6-2024 and missed one dose back in 1-2024, she does have a long H/O GERD and on PPI  No dysphagia, No dyspnea, No dysphonia, No change size of neck, No neck pain or discomfort, No nervousness, No shakiness, No palpitations, Weight stable   BM daily, No diarrhea, No constipation  No edema, No proximal muscle weak  No Cold or Heat Intolerance  No Insomnia  No hair Loss, No Skin Drynress  Appetite is OK  No visual changes  No history head and Neck radiation  No History of thyroid surgery  No family history of Thyroid disease  No prior history of Thyroid Dysfunction     Latest Reference Range & Units 10/08/24 09:34   Sodium 134 - 144 mmol/L 140   Potassium 3.5 - 5.2 mmol/L 4.7   Chloride 96 - 106  mmol/L 102   CO2 20 - 29 mmol/L 23   BUN 8 - 27 mg/dL 12   Creatinine 0.57 - 1.00 mg/dL 0.62   BUN/Creatinine Ratio 12 - 28  19   EGFR Result >59 mL/min/1.73 97   Glucose 70 - 99 mg/dL 88   Calcium 8.7 - 10.3 mg/dL 9.6   PTH Intact (Serial Monitor) 15 - 65 pg/mL 36   25 Hydroxy, Vitamin D 30.0 - 100.0 ng/mL 97.5     Narrative & Impression   DEXA BONE DENSITY AXIAL     Date of Exam:  6/24/2024 9:37 AM EDT     Indication:  menopause  Postmenopausal?rule out osteoporosis     Comparison: 7/10/2023     Technique:  Lumbar vertebral and left forearm Dual-Energy X-ray Absorptiometry (DEXA) was performed on the [employer] C.     Findings:  According to the World Health Organization criteria, this is classified as osteoporosis.     The T-score at the left forearm is -2.5.  The T-score for the total spine is -2.3.        No FRAX data is reported.     IMPRESSION:  Impression:     Osteoporosis.      Latest Reference Range & Units 07/07/24 10:40   Calcium 8.4 - 10.2 mg/dL 8.1 (L) (E)   PTH Intact (Serial Monitor) 8.7 - 77.1 pg/mL 135.0 (H) (E)   Amylase 25 - 125 U/L 25 (E)   Lipase 0 - 59 U/L 22 (E)   WBC 4.5 - 11.0 10*3/uL 8.00 (E)   RBC 4.0 - 5.2 10*6/uL 4.03 (E)   Hemoglobin 12.0 - 16.0 g/dL 12.4 (E)   Hematocrit 36.0 - 46.0 % 37.3 (E)   Platelets 140 - 440 10*3/uL 201 (E)   RDW 12.0 - 16.8 % 14.2 (E)   MCV 80.0 - 100.0 fL 92.6 (E)   MCH 26.0 - 34.0 pg 30.8 (E)   MCHC 31.0 - 37.0 g/dL 33.2 (E)   MPV 8.4 - 12.4 fL 10.5 (E)   Neutrophil Rel % 45 - 80 % 69.8 (E)   Lymphocyte Rel % 15 - 50 % 19.8 (E)   Monocyte Rel % 0 - 15 % 8.6 (E)   Eosinophil Rel % 0 - 7 % 0.6 (E)   Basophil Rel % 0 - 2 % 0.8 (E)   Immature Granulocyte Rel % 0.0 - 1.0 % 0.4 (E)   Neutrophils Absolute 2.0 - 8.8 10*3/uL 5.59 (E)   Lymphocytes Absolute 0.7 - 5.5 10*3/uL 1.58 (E)   Monocytes Absolute 0.0 - 1.7 10*3/uL 0.69 (E)   Eosinophils Absolute 0.0 - 0.8 10*3/uL 0.05 (E)   Basophils Absolute 0.0 - 0.2 10*3/uL 0.06 (E)   Immature Grans, Absolute 0.00 -  0.10 10*3/uL 0.03 (E)   nRBC 0 /100(WBC) 0 (E)   Differential Type (arb'U) Hospital CBC w/AutoDiff (E)   (L): Data is abnormally low  (H): Data is abnormally high  (E): External lab result  Family History   Problem Relation Age of Onset    Breast cancer Maternal Aunt     Cancer Maternal Aunt         Breast    Cancer Mother         Ovarian    Osteoporosis Mother     Heart disease Father     Cancer Father         Esophagus lung    Heart disease Brother     Scoliosis Sister      Social History     Socioeconomic History    Marital status:    Tobacco Use    Smoking status: Former     Current packs/day: 0.00     Average packs/day: 0.3 packs/day for 20.0 years (5.0 ttl pk-yrs)     Types: Cigarettes     Start date: 1/1/1993     Quit date: 1/1/2022     Years since quitting: 3.4    Smokeless tobacco: Never    Tobacco comments:     I smoked off and on for several years. About 4 cigarettes daily   Vaping Use    Vaping status: Never Used   Substance and Sexual Activity    Alcohol use: Yes     Alcohol/week: 5.0 standard drinks of alcohol     Types: 5 Drinks containing 0.5 oz of alcohol per week    Drug use: No    Sexual activity: Defer     Past Medical History:   Diagnosis Date    Allergic     Arthritis     Colon polyp     Hyperlipidemia     Hypertension 04/2024    It seemed to start with severe pain from vertebrae fractures    Injury of back     Osteoporosis     Vertigo      Past Surgical History:   Procedure Laterality Date    APPENDECTOMY      FOOT SURGERY      HYSTERECTOMY      TONSILLECTOMY         Current Outpatient Medications:     aspirin 81 MG tablet, Take  by mouth., Disp: , Rfl:     calcium carbonate EX (Tums Chewy Bites) 750 MG chewable tablet, , Disp: , Rfl:     ciprofloxacin (Cipro) 500 MG tablet, Take 1 tablet by mouth 2 (Two) Times a Day., Disp: 14 tablet, Rfl: 0    ezetimibe (ZETIA) 10 MG tablet, TAKE 1 TABLET BY MOUTH ONCE DAILY, Disp: 90 tablet, Rfl: 0    Multiple Vitamins-Calcium (DAILY VITAMINS FOR  WOMEN PO), , Disp: , Rfl:     omeprazole (priLOSEC) 20 MG capsule, Take 1 capsule by mouth 2 (Two) Times a Day. (Patient taking differently: Take 1 capsule by mouth Daily.), Disp: , Rfl:     Probiotic Product (RISAQUAD-2) capsule capsule, Take 1 capsule by mouth Daily., Disp: , Rfl:     rosuvastatin (CRESTOR) 40 MG tablet, Take 1 tablet by mouth Daily., Disp: 90 tablet, Rfl: 1    VITAMIN D PO, Take  by mouth. (Patient taking differently: Take  by mouth Daily.), Disp: , Rfl:     zolpidem (AMBIEN) 10 MG tablet, Take 1 tablet by mouth At Night As Needed for Sleep., Disp: 30 tablet, Rfl: 1    Current Facility-Administered Medications:     denosumab (PROLIA) syringe 60 mg, 60 mg, Subcutaneous, Once, Rodrigo Wilkes,   Codeine and Oxycodone    Objective   Vitals:    06/03/25 0920   BP: 118/74   Pulse: 77   Temp: 97.7 °F (36.5 °C)   SpO2: 97%            Physical Exam  Neurological:      General: No focal deficit present.      Mental Status: She is alert.   Psychiatric:         Behavior: Behavior normal.               Diagnoses and all orders for this visit:    1. Age-related osteoporosis with current pathological fracture with delayed healing (Primary)             Assessment:     This is a 69 years old female I am seeing for bone disease, referred by PCP     Pt was Dx with Osteoporosis by PCP   Is not on steroids now or in the past   had blood work done by PCP in 7-2024: PTH was 135, N GFR Alk phos bone was 103   had blood work done by PCP 5-2024: TSH was 1.12, Vit D 97  Last DXA scan was in 7-2024  pt states that she had  several compression fractures recently. She underwent 2 kyphoplasty procedures a while ago with good pain relief but he she states that her pain has been coming back recently. She had CT scan of her abdomen and pelvis and chest for a hematology and oncology workup. They demonstrate compression fractures at T8, T11, T12, L3, and well-positioned instrumentation at L4 and L5.      The approach will be to  treat the osteoporosis and to see if there is a secondary cause for compression fractures    PTH after repeating is normal now   Had a low ca and high Alk phos might be related to malabsorption vs PPI use      Plan:    1. No Omeprazole as it can cause Osteoporosis    She can take Tums as needed   2. Labs before the next visit, she will call   PTH   BMP   Vit D 25 [place]   Repeat DXA scan soon, she will call with results   3. Diet and exercise and muscle strengthening   4. Return in 6 months   5. Needs three servings of calcium a day, Tums 750 mg BID and diet   6. Labs reviewed with the Patient : TSH   7- I reviewed the notes from PCP   8- Stay on Prolia per PC , next shot is 12/24  9- Vit D 2000 IU daily        I discussed with the patient/legal representative the risks and the benefits associated with the medications.  The patient/legal representative has been given the opportunity to ask questions.  Alternatives to the proposed treatment (s) were discussed, including the likely results of no treatment.  The patient/legal representative wishes to proceed.       Jessica Salgado MD   06/03/25  09:39 EDT

## 2025-06-04 LAB — PTH-INTACT SERPL-MCNC: 28 PG/ML (ref 15–65)

## 2025-06-17 DIAGNOSIS — M25.551 RIGHT HIP PAIN: Primary | ICD-10-CM

## 2025-06-24 ENCOUNTER — OFFICE VISIT (OUTPATIENT)
Dept: ORTHOPEDIC SURGERY | Facility: CLINIC | Age: 70
End: 2025-06-24
Payer: MEDICARE

## 2025-06-24 VITALS
HEART RATE: 69 BPM | HEIGHT: 66 IN | SYSTOLIC BLOOD PRESSURE: 122 MMHG | BODY MASS INDEX: 30.22 KG/M2 | DIASTOLIC BLOOD PRESSURE: 80 MMHG | WEIGHT: 188 LBS

## 2025-06-24 DIAGNOSIS — M21.70 LEG LENGTH DISCREPANCY: ICD-10-CM

## 2025-06-24 DIAGNOSIS — Z96.643 S/P BILATERAL HIP REPLACEMENTS: Primary | ICD-10-CM

## 2025-06-24 PROCEDURE — 3079F DIAST BP 80-89 MM HG: CPT | Performed by: INTERNAL MEDICINE

## 2025-06-24 PROCEDURE — 99213 OFFICE O/P EST LOW 20 MIN: CPT | Performed by: INTERNAL MEDICINE

## 2025-06-24 PROCEDURE — 3074F SYST BP LT 130 MM HG: CPT | Performed by: INTERNAL MEDICINE

## 2025-06-24 NOTE — PROGRESS NOTES
Subjective:     Patient ID: Blessing Sommers is a 69 y.o. female.    Chief Complaint:    History of Present Illness  Blessing Sommers presents to clinic today for evaluation of low back pain and leg leg discrepancy following bilateral total hip arthroplasties in 2024 by Dr. Crane.  The patient was told that she would have to have a revision surgery to increase her ball length on the right side to make up the leg length discrepancy.  She is here today for second opinion and further evaluation.  She also had 7 vertebral compression fractures following her left hip last year and they are quite concerned about why this happened.     Social History     Occupational History    Not on file   Tobacco Use    Smoking status: Former     Current packs/day: 0.00     Average packs/day: 0.3 packs/day for 20.0 years (5.0 ttl pk-yrs)     Types: Cigarettes     Start date: 1/1/1993     Quit date: 1/1/2022     Years since quitting: 3.4    Smokeless tobacco: Never    Tobacco comments:     I smoked off and on for several years. About 4 cigarettes daily   Vaping Use    Vaping status: Never Used   Substance and Sexual Activity    Alcohol use: Yes     Alcohol/week: 5.0 standard drinks of alcohol     Types: 5 Drinks containing 0.5 oz of alcohol per week    Drug use: Never    Sexual activity: Yes     Partners: Male     Birth control/protection: Post-menopausal      Past Medical History:   Diagnosis Date    Allergic     Ankle sprain 2022    Arthritis     Arthritis of neck 2001    Colon polyp     Diverticulosis 2016    Fracture of ankle 1969    Fracture, tibia and fibula 2016    GERD (gastroesophageal reflux disease) 2012    History of medical problems 06/24    Multiple vertebrae fractures    Hyperlipidemia     Hypertension 04/2024    It seemed to start with severe pain from vertebrae fractures    Injury of back     Low back pain March 2024    Pain after 2 THR    Neuromuscular disorder 2021    Chronic hip and leg pain    Osteopenia      "Osteoporosis     Tennis elbow     And the left elbow     Vertigo     Wrist sprain 1970     Past Surgical History:   Procedure Laterality Date    ADENOIDECTOMY  1972    APPENDECTOMY      BACK SURGERY      Kyphplasty 6 of 7 fractured compression vertebrae    COLONOSCOPY      COSMETIC SURGERY      FOOT SURGERY      FRACTURE SURGERY      Kythoplasty    HYSTERECTOMY      JOINT REPLACEMENT  24    Left hip/ right hip 3/14/24    TONSILLECTOMY      TUBAL ABDOMINAL LIGATION  1988       Family History   Problem Relation Age of Onset    Breast cancer Maternal Aunt     Cancer Maternal Aunt         Breast    Cancer Mother         Ovarian    Osteoporosis Mother     Heart disease Mother     Heart disease Father     Cancer Father         Esophagus lung    Heart disease Brother     Scoliosis Sister     Osteoporosis Sister     Heart disease Maternal Grandmother     Heart disease Paternal Grandfather     Heart disease Paternal Grandmother     Heart disease Paternal Aunt     Scoliosis Brother     Early death Son         Suicide    Heart disease Paternal Aunt                  Objective:  Vitals:    25 1000   BP: 122/80   Pulse: 69   Weight: 85.3 kg (188 lb)   Height: 167.6 cm (65.98\")         25  1000   Weight: 85.3 kg (188 lb)     Body mass index is 30.36 kg/m².           Right Hip Exam     Tenderness   The patient is experiencing no tenderness.     Range of Motion   Flexion:  normal   External rotation:  normal   Internal rotation:  normal       Left Hip Exam     Tenderness   The patient is experiencing no tenderness.     Range of Motion   Flexion:  normal   External rotation:  normal   Internal rotation: normal     Comments:  Left leg 2 to 3 mm longer               Imagin views of the right hip and pelvis were ordered and reviewed by myself in the office today  Indication: bilateral hip replacement  Findings: X-rays demonstrate a bilateral total hip arthroplasty with implants in expected " position. offset appears clinically equal based off radiographic markers.  No signs of fracture, dislocation, subluxation, subsidence, or migration.  There appears to be maybe a 1 to 3 mm leg length discrepancy based off radiographic markers  Comparative studies: none      Assessment:          1. S/P bilateral hip replacements    2. Leg length discrepancy           Plan:          Discussed treatment options at length with patient at today's visit.  I discussed with the patient that she has mostly a 3 mm leg length discrepancy.  I discussed that I cannot explain why she had vertebral compression fractures following her other hip replacement.  I did discuss that the only way to surgically fix her leg length discrepancy would be to revise it with the right or left side.  I would recommend if she were to have surgery and lengthening the right side versus shortening the left.  I did discuss that I do not think that this will alleviate her back issues as she has had multiple compression fractures and multiple kyphoplasties.  I think all of her back problems are coming from her back not from her leg length discrepancy.  She is adamant that she does not want another surgery.  And I do not believe that another surgery would help her.  I recommend a shoe lift in her right side if that is helping her.  She may follow-up as needed  Follow up: As needed      Blessing Sommers was in agreement with plan and had all questions answered.     Medications:  No orders of the defined types were placed in this encounter.      Followup:  No follow-ups on file.    Diagnoses and all orders for this visit:    1. S/P bilateral hip replacements (Primary)  -     XR Hip With or Without Pelvis 2 - 3 View Right; Future    2. Leg length discrepancy          Dictated utilizing Dragon dictation

## 2025-06-25 ENCOUNTER — PATIENT ROUNDING (BHMG ONLY) (OUTPATIENT)
Dept: ORTHOPEDIC SURGERY | Facility: CLINIC | Age: 70
End: 2025-06-25
Payer: MEDICARE

## 2025-06-30 ENCOUNTER — TELEPHONE (OUTPATIENT)
Dept: FAMILY MEDICINE CLINIC | Facility: CLINIC | Age: 70
End: 2025-06-30
Payer: MEDICARE

## 2025-07-01 ENCOUNTER — HOSPITAL ENCOUNTER (OUTPATIENT)
Dept: INFUSION THERAPY | Facility: HOSPITAL | Age: 70
Discharge: HOME | End: 2025-07-01
Payer: MEDICARE

## 2025-07-01 ENCOUNTER — APPOINTMENT (OUTPATIENT)
Dept: BONE DENSITY | Facility: HOSPITAL | Age: 70
End: 2025-07-01
Payer: MEDICARE

## 2025-07-01 VITALS
TEMPERATURE: 96.1 F | RESPIRATION RATE: 16 BRPM | DIASTOLIC BLOOD PRESSURE: 76 MMHG | HEART RATE: 73 BPM | OXYGEN SATURATION: 95 % | SYSTOLIC BLOOD PRESSURE: 133 MMHG

## 2025-07-01 DIAGNOSIS — M80.00XG AGE-RELATED OSTEOPOROSIS WITH CURRENT PATHOLOGICAL FRACTURE WITH DELAYED HEALING: ICD-10-CM

## 2025-07-01 DIAGNOSIS — M81.0 AGE-RELATED OSTEOPOROSIS WITHOUT CURRENT PATHOLOGICAL FRACTURE: Primary | ICD-10-CM

## 2025-07-01 DIAGNOSIS — M80.00XG AGE-RELATED OSTEOPOROSIS WITH CURRENT PATHOLOGICAL FRACTURE WITH DELAYED HEALING: Primary | ICD-10-CM

## 2025-07-01 DIAGNOSIS — Z78.0 POSTMENOPAUSAL: ICD-10-CM

## 2025-07-01 LAB
MAGNESIUM SERPL-MCNC: 2 MG/DL (ref 1.6–2.4)
PHOSPHATE SERPL-MCNC: 3.3 MG/DL (ref 2.5–4.5)

## 2025-07-01 PROCEDURE — 96372 THER/PROPH/DIAG INJ SC/IM: CPT

## 2025-07-01 PROCEDURE — 84100 ASSAY OF PHOSPHORUS: CPT | Performed by: FAMILY MEDICINE

## 2025-07-01 PROCEDURE — 83735 ASSAY OF MAGNESIUM: CPT | Performed by: FAMILY MEDICINE

## 2025-07-01 PROCEDURE — 96374 THER/PROPH/DIAG INJ IV PUSH: CPT

## 2025-07-01 PROCEDURE — 36415 COLL VENOUS BLD VENIPUNCTURE: CPT

## 2025-07-01 PROCEDURE — 96365 THER/PROPH/DIAG IV INF INIT: CPT

## 2025-07-01 PROCEDURE — 25010000002 DENOSUMAB 60 MG/ML SOLUTION PREFILLED SYRINGE: Performed by: FAMILY MEDICINE

## 2025-07-01 PROCEDURE — 77080 DXA BONE DENSITY AXIAL: CPT

## 2025-07-01 RX ADMIN — DENOSUMAB 60 MG: 60 INJECTION SUBCUTANEOUS at 11:57

## 2025-07-01 NOTE — PATIENT INSTRUCTIONS
"  Call [facility] [place] at [phone] if you have any problems or concerns.    We know you have a Choice in healthcare and appreciate you using [facility].  Our purpose is to provide you \"Excellent Care\".  We hope that you will always choose us in the future and continue to recommend us to your family and friends.              "

## 2025-07-01 NOTE — NURSING NOTE
Pt arrived to [facility] for appt. VSS, no complaints at this time. Labs drawn via venipuncture. Medication administered per MD order. Pt tolerated well. Scheduled next appt. Pt discharged from [facility] at 12:01 PM in stable condition, without complaints.

## 2025-08-18 ENCOUNTER — TRANSCRIBE ORDERS (OUTPATIENT)
Dept: ADMINISTRATIVE | Facility: HOSPITAL | Age: 70
End: 2025-08-18
Payer: MEDICARE

## 2025-08-18 DIAGNOSIS — Z12.31 BREAST CANCER SCREENING BY MAMMOGRAM: Primary | ICD-10-CM

## 2025-08-21 DIAGNOSIS — E78.2 MIXED HYPERLIPIDEMIA: ICD-10-CM

## 2025-08-21 RX ORDER — EZETIMIBE 10 MG/1
10 TABLET ORAL DAILY
Qty: 90 TABLET | Refills: 0 | Status: SHIPPED | OUTPATIENT
Start: 2025-08-21